# Patient Record
Sex: MALE | NOT HISPANIC OR LATINO | URBAN - METROPOLITAN AREA
[De-identification: names, ages, dates, MRNs, and addresses within clinical notes are randomized per-mention and may not be internally consistent; named-entity substitution may affect disease eponyms.]

---

## 2023-04-26 ENCOUNTER — OFFICE VISIT (OUTPATIENT)
Dept: FAMILY MEDICINE | Facility: CLINIC | Age: 53
End: 2023-04-26
Payer: COMMERCIAL

## 2023-04-26 VITALS
BODY MASS INDEX: 27.9 KG/M2 | RESPIRATION RATE: 16 BRPM | TEMPERATURE: 97.6 F | DIASTOLIC BLOOD PRESSURE: 84 MMHG | HEART RATE: 91 BPM | OXYGEN SATURATION: 95 % | HEIGHT: 72 IN | SYSTOLIC BLOOD PRESSURE: 128 MMHG | WEIGHT: 206 LBS

## 2023-04-26 DIAGNOSIS — Z72.0 TOBACCO ABUSE: ICD-10-CM

## 2023-04-26 DIAGNOSIS — M25.50 MULTIPLE JOINT PAIN: ICD-10-CM

## 2023-04-26 DIAGNOSIS — Z13.220 LIPID SCREENING: ICD-10-CM

## 2023-04-26 DIAGNOSIS — R10.31 RIGHT GROIN PAIN: ICD-10-CM

## 2023-04-26 DIAGNOSIS — D22.9 ATYPICAL MOLE: ICD-10-CM

## 2023-04-26 DIAGNOSIS — Z13.1 SCREENING FOR DIABETES MELLITUS: ICD-10-CM

## 2023-04-26 DIAGNOSIS — E78.5 HYPERLIPIDEMIA LDL GOAL <100: ICD-10-CM

## 2023-04-26 DIAGNOSIS — Z12.5 SCREENING PSA (PROSTATE SPECIFIC ANTIGEN): ICD-10-CM

## 2023-04-26 DIAGNOSIS — Z00.00 ROUTINE GENERAL MEDICAL EXAMINATION AT A HEALTH CARE FACILITY: Primary | ICD-10-CM

## 2023-04-26 DIAGNOSIS — Z12.11 COLON CANCER SCREENING: ICD-10-CM

## 2023-04-26 LAB
ALBUMIN SERPL-MCNC: 3.8 G/DL (ref 3.4–5)
ALP SERPL-CCNC: 58 U/L (ref 40–150)
ALT SERPL W P-5'-P-CCNC: 37 U/L (ref 0–70)
ANION GAP SERPL CALCULATED.3IONS-SCNC: 6 MMOL/L (ref 3–14)
AST SERPL W P-5'-P-CCNC: 25 U/L (ref 0–45)
BASOPHILS # BLD AUTO: 0.1 10E3/UL (ref 0–0.2)
BASOPHILS NFR BLD AUTO: 1 %
BILIRUB SERPL-MCNC: 0.3 MG/DL (ref 0.2–1.3)
BUN SERPL-MCNC: 20 MG/DL (ref 7–30)
CALCIUM SERPL-MCNC: 8.7 MG/DL (ref 8.5–10.1)
CHLORIDE BLD-SCNC: 109 MMOL/L (ref 94–109)
CHOLEST SERPL-MCNC: 178 MG/DL
CO2 SERPL-SCNC: 26 MMOL/L (ref 20–32)
CREAT SERPL-MCNC: 1.06 MG/DL (ref 0.66–1.25)
EOSINOPHIL # BLD AUTO: 0.2 10E3/UL (ref 0–0.7)
EOSINOPHIL NFR BLD AUTO: 1 %
ERYTHROCYTE [DISTWIDTH] IN BLOOD BY AUTOMATED COUNT: 13.5 % (ref 10–15)
FASTING STATUS PATIENT QL REPORTED: NO
GFR SERPL CREATININE-BSD FRML MDRD: 84 ML/MIN/1.73M2
GLUCOSE BLD-MCNC: 96 MG/DL (ref 70–99)
HBA1C MFR BLD: 5.7 % (ref 0–5.6)
HCT VFR BLD AUTO: 45.6 % (ref 40–53)
HDLC SERPL-MCNC: 34 MG/DL
HGB BLD-MCNC: 15.2 G/DL (ref 13.3–17.7)
LDLC SERPL CALC-MCNC: 111 MG/DL
LYMPHOCYTES # BLD AUTO: 3.2 10E3/UL (ref 0.8–5.3)
LYMPHOCYTES NFR BLD AUTO: 22 %
MCH RBC QN AUTO: 30.9 PG (ref 26.5–33)
MCHC RBC AUTO-ENTMCNC: 33.3 G/DL (ref 31.5–36.5)
MCV RBC AUTO: 93 FL (ref 78–100)
MONOCYTES # BLD AUTO: 0.8 10E3/UL (ref 0–1.3)
MONOCYTES NFR BLD AUTO: 6 %
NEUTROPHILS # BLD AUTO: 10.3 10E3/UL (ref 1.6–8.3)
NEUTROPHILS NFR BLD AUTO: 70 %
NONHDLC SERPL-MCNC: 144 MG/DL
PLATELET # BLD AUTO: 207 10E3/UL (ref 150–450)
POTASSIUM BLD-SCNC: 3.9 MMOL/L (ref 3.4–5.3)
PROT SERPL-MCNC: 7.7 G/DL (ref 6.8–8.8)
PSA SERPL-MCNC: 0.76 UG/L (ref 0–4)
RBC # BLD AUTO: 4.92 10E6/UL (ref 4.4–5.9)
SODIUM SERPL-SCNC: 141 MMOL/L (ref 133–144)
TRIGL SERPL-MCNC: 163 MG/DL
TSH SERPL DL<=0.005 MIU/L-ACNC: 1.45 MU/L (ref 0.4–4)
WBC # BLD AUTO: 14.6 10E3/UL (ref 4–11)

## 2023-04-26 PROCEDURE — G0103 PSA SCREENING: HCPCS | Performed by: PHYSICIAN ASSISTANT

## 2023-04-26 PROCEDURE — 86039 ANTINUCLEAR ANTIBODIES (ANA): CPT | Performed by: PHYSICIAN ASSISTANT

## 2023-04-26 PROCEDURE — 36415 COLL VENOUS BLD VENIPUNCTURE: CPT | Performed by: PHYSICIAN ASSISTANT

## 2023-04-26 PROCEDURE — 99386 PREV VISIT NEW AGE 40-64: CPT | Performed by: PHYSICIAN ASSISTANT

## 2023-04-26 PROCEDURE — 80050 GENERAL HEALTH PANEL: CPT | Performed by: PHYSICIAN ASSISTANT

## 2023-04-26 PROCEDURE — 99204 OFFICE O/P NEW MOD 45 MIN: CPT | Mod: 25 | Performed by: PHYSICIAN ASSISTANT

## 2023-04-26 PROCEDURE — 86431 RHEUMATOID FACTOR QUANT: CPT | Performed by: PHYSICIAN ASSISTANT

## 2023-04-26 PROCEDURE — 83036 HEMOGLOBIN GLYCOSYLATED A1C: CPT | Performed by: PHYSICIAN ASSISTANT

## 2023-04-26 PROCEDURE — 80061 LIPID PANEL: CPT | Performed by: PHYSICIAN ASSISTANT

## 2023-04-26 PROCEDURE — 86038 ANTINUCLEAR ANTIBODIES: CPT | Performed by: PHYSICIAN ASSISTANT

## 2023-04-26 ASSESSMENT — ENCOUNTER SYMPTOMS
NERVOUS/ANXIOUS: 1
JOINT SWELLING: 0
PARESTHESIAS: 0
HEADACHES: 1
HEMATOCHEZIA: 0
FEVER: 0
ARTHRALGIAS: 1
MYALGIAS: 0
DYSURIA: 0
SHORTNESS OF BREATH: 0
CONSTIPATION: 0
ABDOMINAL PAIN: 0
SORE THROAT: 1
FREQUENCY: 0
DIARRHEA: 0
WEAKNESS: 0
NAUSEA: 0
CHILLS: 0
HEMATURIA: 0
PALPITATIONS: 0
EYE PAIN: 0
HEARTBURN: 0

## 2023-04-26 ASSESSMENT — PAIN SCALES - GENERAL: PAINLEVEL: MODERATE PAIN (5)

## 2023-04-26 NOTE — PROGRESS NOTES
SUBJECTIVE:   CC: Roverto is an 52 year old who presents for preventative health visit.        View : No data to display.            Patient has been advised of split billing requirements and indicates understanding: Yes  Healthy Habits:     Getting at least 3 servings of Calcium per day:  Yes    Bi-annual eye exam:  NO    Dental care twice a year:  NO    Sleep apnea or symptoms of sleep apnea:  Sleep apnea    Diet:  Other    Frequency of exercise:  2-3 days/week    Duration of exercise:  30-45 minutes    Taking medications regularly:  Yes    Medication side effects:  Not applicable    PHQ-2 Total Score: 2    Additional concerns today:  No    Patient works as a district  as well as a .  Typically works 16 hours/day.    Patient did participate in [employer] trial.  States that he was noted to have elevated blood pressures during that.    Patient has been having multiple joint pains over the last 3 to 4 years.  This has been worsening.  He is worried about rheumatoid arthritis as there is family history.  For his discomfort he has been taking Aleve roughly 4 tablets 4-5 times per day.  Does not take any Tylenol for this.  Does report family history of renal issues.    Patient also has a lesion over the right side of his face.  Noticed this mole has been increasing in size as well as bleeding over the last 1 to 2 years.  It has been dry and flaking.  Occasionally painful.    Has prior contracture of left fourth digit.  This is unchanged from baseline as he had surgery when in his 20s.    Patient with right-sided groin pain worse with coughing.  No testicular pain.       Today's PHQ-2 Score:       4/26/2023     6:58 AM   PHQ-2 ( 1999 Pfizer)   Q1: Little interest or pleasure in doing things 1   Q2: Feeling down, depressed or hopeless 1   PHQ-2 Score 2   Q1: Little interest or pleasure in doing things Not at all    Several days   Q2: Feeling down, depressed or hopeless More than half the  days    Several days   PHQ-2 Score 2    2     Social History     Tobacco Use     Smoking status: Every Day     Types: Cigarettes     Smokeless tobacco: Never   Vaping Use     Vaping status: Never Used   Substance Use Topics     Alcohol use: Not on file   Patient smokes 1 to 1.5 packs/day.  Does not want any smoking cessation information at this time.        4/26/2023     6:58 AM   Alcohol Use   Prescreen: >3 drinks/day or >7 drinks/week? No   Occasional alcohol use with bowling.    Last PSA: No results found for: PSA    Reviewed orders with patient. Reviewed health maintenance      Reviewed and updated as needed this visit by clinical staff   Tobacco  Allergies  Meds              Reviewed and updated as needed this visit by Provider                 No past medical history on file.   No past surgical history on file.    Review of Systems   Constitutional: Negative for chills and fever.   HENT: Positive for sore throat. Negative for congestion, ear pain and hearing loss.    Eyes: Positive for visual disturbance. Negative for pain.   Respiratory: Negative for shortness of breath.    Cardiovascular: Positive for peripheral edema. Negative for chest pain and palpitations.   Gastrointestinal: Negative for abdominal pain, constipation, diarrhea, heartburn, hematochezia and nausea.   Genitourinary: Negative for dysuria, frequency, genital sores, hematuria, impotence, penile discharge and urgency.   Musculoskeletal: Positive for arthralgias. Negative for joint swelling and myalgias.   Skin: Negative for rash.   Neurological: Positive for headaches. Negative for weakness and paresthesias.   Psychiatric/Behavioral: Negative for mood changes. The patient is nervous/anxious.        OBJECTIVE:   /84   Pulse 91   Temp 97.6  F (36.4  C) (Tympanic)   Resp 16   Ht 1.829 m (6')   Wt 93.4 kg (206 lb)   SpO2 95%   BMI 27.94 kg/m      Physical Exam  GENERAL: healthy, alert and no distress  EYES: Eyes grossly normal to  inspection, PERRL and conjunctivae and sclerae normal  HENT: ear canals and TM's normal, nose and mouth without ulcers or lesions  NECK: no adenopathy, no asymmetry, masses, or scars and thyroid normal to palpation  RESP: lungs clear to auscultation - no rales, rhonchi or wheezes  CV: regular rate and rhythm, normal S1 S2, no S3 or S4, no murmur, click or rub, no peripheral edema and peripheral pulses strong  ABDOMEN: soft, nontender, no hepatosplenomegaly, no masses and bowel sounds normal  MS: Contracture of the left fourth digit with no other obvious bony deformities on examination.  SKIN: Right cheek with 1 cm x 1.4 cm mole with irregular borders and raised tissue.  No obvious bleeding.  No surrounding erythema  NEURO: Normal strength and tone, mentation intact and speech normal  PSYCH: mentation appears normal, affect normal/bright    ASSESSMENT/PLAN:   (Z00.00) Routine general medical examination at a health care facility  (primary encounter diagnosis)  Comment: Here for routine screening examination  Plan: Comprehensive metabolic panel (BMP + Alb, Alk         Phos, ALT, AST, Total. Bili, TP), CBC with         platelets and differential, TSH with free T4         reflex          (Z13.1) Screening for diabetes mellitus  Comment: Discussed screening labs  Plan: Comprehensive metabolic panel (BMP + Alb, Alk         Phos, ALT, AST, Total. Bili, TP), Hemoglobin         A1c          (Z13.220) Lipid screening  Comment: Discussed screening labs  Plan: Lipid panel reflex to direct LDL Fasting          (Z12.5) Screening PSA (prostate specific antigen)  Comment: Patient after the age of 50 no prior PSA screen.  Discussed with patient indications for this.  Plan: PSA, screen          (D22.9) Atypical mole  Comment: Patient has atypical mole over the right side of his face that has been increasing in size as well as bleeding over the last year.  Discussed with the patient significant concern that this may represent a more  concerning process and needs to follow-up with dermatology more urgently.  Patient expressed understanding.  Plan: Adult Dermatology Referral          (M25.50) Multiple joint pain  Comment: Multiple joint pain.  Patient has been taking large amounts of Aleve.  I discussed with him at length to decrease the Aleve dose he has been consuming out of concerns of kidney impairment.  Patient stressed understanding.  He will try to cut back on this as well as use Tylenol as needed for pain control.  Discussed rheumatoid factor as well as MANDI given multiple joint pain and family history of rheumatoid.  Plan: Comprehensive metabolic panel (BMP + Alb, Alk         Phos, ALT, AST, Total. Bili, TP), Rheumatoid         factor, Anti Nuclear Elisabeth IgG by IFA with Reflex          (R10.31) Right groin pain  Comment: Right groin pain with coughing.  No significant palpable protrusion on examination.  Patient does have tenderness over distribution of inguinal hernia.  Discussed ultrasound of this.  Patient will call to schedule.  Plan: US Hernia Evaluation          (Z12.11) Colon cancer screening  Comment: Colonoscopy referral placed.  Plan: Colonoscopy Screening  Referral      COUNSELING:   Reviewed preventive health counseling, as reflected in patient instructions       Regular exercise       Healthy diet/nutrition       Vision screening       Alcohol Use        Colorectal cancer screening       Prostate cancer screening      BMI:   Estimated body mass index is 27.94 kg/m  as calculated from the following:    Height as of this encounter: 1.829 m (6').    Weight as of this encounter: 93.4 kg (206 lb).   Weight management plan: Discussed healthy diet and exercise guidelines      He reports that he has been smoking cigarettes. He has never used smokeless tobacco.  Nicotine/Tobacco Cessation Plan:   Information offered: Patient not interested at this time            JEAN Mcgowan [facility]

## 2023-04-26 NOTE — LETTER
April 27, 2023      Roverto Dave  [address] [place] [address]        Dear ,    We are writing to inform you of your test results.    Mr. Dave,     Please schedule your appointment with dermatology. The dermatology clinic had tried to reach you. I strongly advise you follow up for further evaluation of your skin lesion.   [facility] will call you to coordinate your care as prescribed by your provider. If you don't hear from a representative within 2 business days, please call [phone].     Your prostate antigen screening is normal.     Your kidney function and liver function are normal.     Normal thyroid function study.     Your white blood cell count is elevated. You were not having any infectious symptoms but we will continue to monitor this.     Your hemoglobin A1c is elevated to the prediabetic range. As discussed healthy lifestyle modifications including exercising 150 minutes per week. We will continue to monitor this.       Your triglycerides and LDL cholesterol are elevated. -LDL(bad) cholesterol level is elevated, and your triglycerides are elevated which can increase your heart disease risk.  A diet high in fat and simple carbohydrates, genetics and being overweight can contribute to this. ADVISE: exercising 150 minutes of aerobic exercise per week (30 minutes for 5 days per week or 50 minutes for 3 days per week are options), eating a low saturated fat/low carbohydrate diet, and omega-3 fatty acids (fish oil) 0311-0617 mg daily are helpful to improve this. In 12 months, you should recheck your fasting cholesterol panel by scheduling a lab-only appointment.       I would recommend that we start you on a statin medication for this as you are aty increased risk of cardiovascular disease. The largest side effect of this medication is muscle aches. Take in the evening to decrease side effects. Follow up in one month to assess how you are tolerating the medications.           The  10-year ASCVD risk score (Colleen MANN, et al., 2019) is: 11.4%     Values used to calculate the score:       Age: 52 years       Sex: Male       Is Non- : No       Diabetic: No       Tobacco smoker: Yes       Systolic Blood Pressure: 128 mmHg       Is BP treated: No       HDL Cholesterol: 34 mg/dL       Total Cholesterol: 178 mg/dL       If you have any further questions please reach out to the clinic.       Resulted Orders   Comprehensive metabolic panel (BMP + Alb, Alk Phos, ALT, AST, Total. Bili, TP)   Result Value Ref Range    Sodium 141 133 - 144 mmol/L    Potassium 3.9 3.4 - 5.3 mmol/L    Chloride 109 94 - 109 mmol/L    Carbon Dioxide (CO2) 26 20 - 32 mmol/L    Anion Gap 6 3 - 14 mmol/L    Urea Nitrogen 20 7 - 30 mg/dL    Creatinine 1.06 0.66 - 1.25 mg/dL    Calcium 8.7 8.5 - 10.1 mg/dL    Glucose 96 70 - 99 mg/dL    Alkaline Phosphatase 58 40 - 150 U/L    AST 25 0 - 45 U/L    ALT 37 0 - 70 U/L    Protein Total 7.7 6.8 - 8.8 g/dL    Albumin 3.8 3.4 - 5.0 g/dL    Bilirubin Total 0.3 0.2 - 1.3 mg/dL    GFR Estimate 84 >60 mL/min/1.73m2      Comment:      eGFR calculated using 2021 CKD-EPI equation.   Lipid panel reflex to direct LDL Fasting   Result Value Ref Range    Cholesterol 178 <200 mg/dL    Triglycerides 163 (H) <150 mg/dL    Direct Measure HDL 34 (L) >=40 mg/dL    LDL Cholesterol Calculated 111 (H) <=100 mg/dL    Non HDL Cholesterol 144 (H) <130 mg/dL    Patient Fasting > 8hrs? No     Narrative    Cholesterol  Desirable:  <200 mg/dL    Triglycerides  Normal:  Less than 150 mg/dL  Borderline High:  150-199 mg/dL  High:  200-499 mg/dL  Very High:  Greater than or equal to 500 mg/dL    Direct Measure HDL  Female:  Greater than or equal to 50 mg/dL   Male:  Greater than or equal to 40 mg/dL    LDL Cholesterol  Desirable:  <100mg/dL  Above Desirable:  100-129 mg/dL   Borderline High:  130-159 mg/dL   High:  160-189 mg/dL   Very High:  >= 190 mg/dL    Non HDL Cholesterol  Desirable:  130  mg/dL  Above Desirable:  130-159 mg/dL  Borderline High:  160-189 mg/dL  High:  190-219 mg/dL  Very High:  Greater than or equal to 220 mg/dL   PSA, screen   Result Value Ref Range    Prostate Specific Antigen Screen 0.76 0.00 - 4.00 ug/L   Hemoglobin A1c   Result Value Ref Range    Hemoglobin A1C 5.7 (H) 0.0 - 5.6 %      Comment:      Normal <5.7%   Prediabetes 5.7-6.4%    Diabetes 6.5% or higher     Note: Adopted from ADA consensus guidelines.   TSH with free T4 reflex   Result Value Ref Range    TSH 1.45 0.40 - 4.00 mU/L   CBC with platelets and differential   Result Value Ref Range    WBC Count 14.6 (H) 4.0 - 11.0 10e3/uL    RBC Count 4.92 4.40 - 5.90 10e6/uL    Hemoglobin 15.2 13.3 - 17.7 g/dL    Hematocrit 45.6 40.0 - 53.0 %    MCV 93 78 - 100 fL    MCH 30.9 26.5 - 33.0 pg    MCHC 33.3 31.5 - 36.5 g/dL    RDW 13.5 10.0 - 15.0 %    Platelet Count 207 150 - 450 10e3/uL    % Neutrophils 70 %    % Lymphocytes 22 %    % Monocytes 6 %    % Eosinophils 1 %    % Basophils 1 %    Absolute Neutrophils 10.3 (H) 1.6 - 8.3 10e3/uL    Absolute Lymphocytes 3.2 0.8 - 5.3 10e3/uL    Absolute Monocytes 0.8 0.0 - 1.3 10e3/uL    Absolute Eosinophils 0.2 0.0 - 0.7 10e3/uL    Absolute Basophils 0.1 0.0 - 0.2 10e3/uL       If you have any questions or concerns, please call the clinic at the number listed above.       Sincerely,      Onesimo Jaeger PA-C

## 2023-04-26 NOTE — PATIENT INSTRUCTIONS
Call [phone] to schedule your imaging study.         Preventive Health Recommendations  Male Ages 50 - 64    Yearly exam:             See your health care provider every year in order to  o   Review health changes.   o   Discuss preventive care.    o   Review your medicines if your doctor has prescribed any.   Have a cholesterol test every 5 years, or more frequently if you are at risk for high cholesterol/heart disease.   Have a diabetes test (fasting glucose) every three years. If you are at risk for diabetes, you should have this test more often.   Have a colonoscopy at age 50, or have a yearly FIT test (stool test). These exams will check for colon cancer.    Talk with your health care provider about whether or not a prostate cancer screening test (PSA) is right for you.  You should be tested each year for STDs (sexually transmitted diseases), if you re at risk.     Shots: Get a flu shot each year. Get a tetanus shot every 10 years.     Nutrition:  Eat at least 5 servings of fruits and vegetables daily.   Eat whole-grain bread, whole-wheat pasta and brown rice instead of white grains and rice.   Get adequate Calcium and Vitamin D.     Lifestyle  Exercise for at least 150 minutes a week (30 minutes a day, 5 days a week). This will help you control your weight and prevent disease.   Limit alcohol to one drink per day.   No smoking.   Wear sunscreen to prevent skin cancer.   See your dentist every six months for an exam and cleaning.   See your eye doctor every 1 to 2 years.

## 2023-04-26 NOTE — PROGRESS NOTES
"SUBJECTIVE:   CC: Roverto is an 52 year old who presents for preventative health visit.        View : No data to display.            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 :641767}    {additional problems to add (Optional):607221}    Today's PHQ-2 Score:       4/26/2023     6:58 AM   PHQ-2 ( 1999 Pfizer)   Q1: Little interest or pleasure in doing things 1   Q2: Feeling down, depressed or hopeless 1   PHQ-2 Score 2   Q1: Little interest or pleasure in doing things Not at all    Several days   Q2: Feeling down, depressed or hopeless More than half the days    Several days   PHQ-2 Score 2    2     Social History     Tobacco Use     Smoking status: Every Day     Types: Cigarettes     Smokeless tobacco: Never   Vaping Use     Vaping status: Never Used   Substance Use Topics     Alcohol use: Not on file     1-1.5 PPD does not want resources right not         4/26/2023     6:58 AM   Alcohol Use   Prescreen: >3 drinks/day or >7 drinks/week? No   occasional alcohol. Usually one drink with bow  Last PSA: No results found for: PSA    Reviewed orders with patient. Reviewed health maintenance and updated orders accordingly - { :409498::\"Yes\"}  {Chronicprobdata (optional):012611}    Reviewed and updated as needed this visit " "by clinical staff   Tobacco  Allergies  Meds              Reviewed and updated as needed this visit by Provider                 {HISTORY OPTIONS (Optional):166818}    Review of Systems   Constitutional: Negative for chills and fever.   HENT: Positive for sore throat. Negative for congestion, ear pain and hearing loss.    Eyes: Positive for visual disturbance. Negative for pain.   Respiratory: Negative for shortness of breath.    Cardiovascular: Positive for peripheral edema. Negative for chest pain and palpitations.   Gastrointestinal: Negative for abdominal pain, constipation, diarrhea, heartburn, hematochezia and nausea.   Genitourinary: Negative for dysuria, frequency, genital sores, hematuria, impotence, penile discharge and urgency.   Musculoskeletal: Positive for arthralgias. Negative for joint swelling and myalgias.   Skin: Negative for rash.   Neurological: Positive for headaches. Negative for weakness and paresthesias.   Psychiatric/Behavioral: Negative for mood changes. The patient is nervous/anxious.        OBJECTIVE:   /84   Pulse 91   Temp 97.6  F (36.4  C) (Tympanic)   Resp 16   Ht 1.829 m (6')   Wt 93.4 kg (206 lb)   SpO2 95%   BMI 27.94 kg/m      Physical Exam   1 cm x 1.4 cm right face just below the eye   {Exam Choices (Optional):041029}    {Diagnostic Test Results (Optional):221082::\"Diagnostic Test Results:\",\"Labs reviewed in Epic\"}    ASSESSMENT/PLAN:   {Diag Picklist:889309}    {Patient advised of split billing (Optional):783119}      COUNSELING:   {MALE COUNSELING MESSAGES:968775::\"Reviewed preventive health counseling, as reflected in patient instructions\"}      BMI:   Estimated body mass index is 27.94 kg/m  as calculated from the following:    Height as of this encounter: 1.829 m (6').    Weight as of this encounter: 93.4 kg (206 lb).   {Weight Management Plan needed for ACO:167494}      He reports that he has been smoking cigarettes. He has never used smokeless " tobacco.  Nicotine/Tobacco Cessation Plan:   {Nicotine/Tobacco Cessation Plan:393452}      {Counseling Resources  [place] Preventive Services Task Force  Cholesterol Screening  Health diet/nutrition  Pooled Cohorts Equation Calculator  USDA's MyPlate  ASA Prophylaxis  Lung CA Screening  Osteoporosis prevention/bone health :669467}  {Prostate Cancer Screening  Consider for men 55-69 per guidance from USPSTF :830978}    JEAN Mcgowan [facility]

## 2023-04-27 LAB
ANA PAT SER IF-IMP: ABNORMAL
ANA SER QL IF: POSITIVE
ANA TITR SER IF: ABNORMAL {TITER}
RHEUMATOID FACT SER NEPH-ACNC: <7 IU/ML

## 2023-04-27 RX ORDER — ATORVASTATIN CALCIUM 20 MG/1
20 TABLET, FILM COATED ORAL DAILY
Qty: 30 TABLET | Refills: 0 | Status: SHIPPED | OUTPATIENT
Start: 2023-04-27 | End: 2023-05-27

## 2023-04-28 ENCOUNTER — TELEPHONE (OUTPATIENT)
Dept: FAMILY MEDICINE | Facility: CLINIC | Age: 53
End: 2023-04-28
Payer: COMMERCIAL

## 2023-04-28 NOTE — TELEPHONE ENCOUNTER
----- Message from Onesimo Jaeger PA-C sent at 4/28/2023  7:23 AM CDT -----  Please call patient,     Mr. Dave,     Your MANDI test was positive.  Rheumatoid factor was not elevated.  I have placed referral for rheumatology.    Your cholesterol numbers were elevated.  I would suggest we start you on a statin medication.  You can have muscle aches with this medication.  Please start taking medication in the evening.  Follow-up within the next months to recheck to see how you are tolerating this medication.  I have sent atorvastatin to your pharmacy    Hemoglobin A1c is slightly elevated in the prediabetic category.    Please schedule with dermatology for evaluation of your skin lesion.    Onesimo Jaeger PA-C

## 2023-04-28 NOTE — TELEPHONE ENCOUNTER
Left a message to return a call to [phone].  Cristina Hardy R.N.      04/27/23 Sent (none) Onesimo Jaeger, [facility]     atorvastatin (LIPITOR) 20 MG tablet 30 tablet 0 4/27/2023 5/27/2023     [id number] - [place] - [address]

## 2023-04-28 NOTE — TELEPHONE ENCOUNTER
Patient returned call to clinic. Relayed provider results message as noted below. Reviewed care plan and recommendations from provider. Patient voiced good understanding and agreed to all plans.   No questions at this time from patient.      Leonie Ge RN  Clinical Triage/Primary Care  [facility]

## 2023-05-08 ENCOUNTER — TELEPHONE (OUTPATIENT)
Dept: DERMATOLOGY | Facility: CLINIC | Age: 53
End: 2023-05-08
Payer: COMMERCIAL

## 2023-05-08 NOTE — TELEPHONE ENCOUNTER
[facility] Call Center    Phone Message    May a detailed message be left on voicemail: yes     Reason for Call: Other: Pt has ref for Atypical mole [D22.9]  ref by Onesimo Jaeger, / Urgent: 3-5 Days / Please call [phone]. Thanks!      Action Taken: Message routed to:  [facility] DERM    Travel Screening: Not Applicable

## 2023-06-08 ENCOUNTER — OFFICE VISIT (OUTPATIENT)
Dept: DERMATOLOGY | Facility: CLINIC | Age: 53
End: 2023-06-08
Payer: COMMERCIAL

## 2023-06-08 DIAGNOSIS — L82.0 INFLAMED SEBORRHEIC KERATOSIS: Primary | ICD-10-CM

## 2023-06-08 PROCEDURE — 17110 DESTRUCTION B9 LES UP TO 14: CPT | Performed by: NURSE PRACTITIONER

## 2023-06-08 NOTE — PROGRESS NOTES
[facility] Dermatology Note  Encounter Date: Jun 8, 2023  Office Visit     Reviewed patients past medical history and pertinent chart review prior to patients visit today.     Dermatology Problem List:  ISK face and right upper arm, cryo 6/8/2023     Patient denies family history of skin cancer or dysplastic nevi.   Patient denies personal history of skin cancer or dysplastic nevi.   ____________________________________________    Assessment & Plan:     # Irritated and inflamed Seborrheic Keratosis.   -Discussed treatment options with patient including no treatment, cryotherapy, and shave removal. Patient prefers cryotherapy today due to irritation and itching. After verbal consent and discussion of risks and benefits including but no limited to dyspigmentation/scar, blister, and pain, 3 on the left cheek, 1 on the left nose, 1 on the forehead, 1 on the right upper arm, and 2 on the right cheek with 8 in total were treated with 1-2mm freeze border for 2 cycles with liquid nitrogen. Post cryotherapy instructions were provided.     Follow-up in 6 weeks if desires further treatment    Written by Kelly Batista working as a scribe for ALESHA Link CNP on 06/08/23    Alcira Pugh CNP  Dermatology   _______________________________________    CC: Skin Check (Spot check x4: 3 on face and one on right arm. Changing. Has been going on for a few years )    HPI:  Mr. Roverto Dave is a(n) 52 year old male who presents today as a new patient for spot check with three on the face and one on the right arm.  The one on his face has tripled in size over the last 5 years and will only hurt in the winter time.  In the winter, he will notice some scaly, flakiness and itchiness to the papule.  In the last year smaller lesions scattered throughout his face have started to appear with some being sore.  He noticed another one on the right upper arm which has grown in size.  He is interested in having these  lesions frozen today.    Patient is otherwise feeling well, without additional skin concerns.    Physical Exam:  SKIN: Focused examination of face and upper extremitie was performed.  - Right medial cheek an elongated at least 1 cm brown cerebriform waxy plaque  - Several small tan waxy papules to the face with one on the right upper arm  - No other lesions of concern on areas examined.     Medications:  Current Outpatient Medications   Medication     atorvastatin (LIPITOR) 20 MG tablet     No current facility-administered medications for this visit.      Past Medical History:   There is no problem list on file for this patient.    History reviewed. No pertinent past medical history.    CC No referring provider defined for this encounter. on close of this encounter.

## 2023-06-08 NOTE — LETTER
6/8/2023         RE: Roverto Dave  [address]  [place] [address]        Dear Colleague,    Thank you for referring your patient, Roverto Dave, to the [facility]. Please see a copy of my visit note below.    [facility] Dermatology Note  Encounter Date: Jun 8, 2023  Office Visit     Reviewed patients past medical history and pertinent chart review prior to patients visit today.     Dermatology Problem List:  ISK face and right upper arm, cryo 6/8/2023     Patient denies family history of skin cancer or dysplastic nevi.   Patient denies personal history of skin cancer or dysplastic nevi.   ____________________________________________    Assessment & Plan:     # Irritated and inflamed Seborrheic Keratosis.   -Discussed treatment options with patient including no treatment, cryotherapy, and shave removal. Patient prefers cryotherapy today due to irritation and itching. After verbal consent and discussion of risks and benefits including but no limited to dyspigmentation/scar, blister, and pain, 3 on the left cheek, 1 on the left nose, 1 on the forehead, 1 on the right upper arm, and 2 on the right cheek with 8 in total were treated with 1-2mm freeze border for 2 cycles with liquid nitrogen. Post cryotherapy instructions were provided.     Follow-up in 6 weeks if desires further treatment    Written by Kelly Batista working as a scribe for ALESHA Link CNP on 06/08/23    Alcira Pugh CNP  Dermatology   _______________________________________    CC: Skin Check (Spot check x4: 3 on face and one on right arm. Changing. Has been going on for a few years )    HPI:  Mr. Roverto Dave is a(n) 52 year old male who presents today as a new patient for spot check with three on the face and one on the right arm.  The one on his face has tripled in size over the last 5 years and will only hurt in the winter time.  In the winter, he will notice some scaly, flakiness and itchiness  to the papule.  In the last year smaller lesions scattered throughout his face have started to appear with some being sore.  He noticed another one on the right upper arm which has grown in size.  He is interested in having these lesions frozen today.    Patient is otherwise feeling well, without additional skin concerns.    Physical Exam:  SKIN: Focused examination of face and upper extremitie was performed.  - Right medial cheek an elongated at least 1 cm brown cerebriform waxy plaque  - Several small tan waxy papules to the face with one on the right upper arm  - No other lesions of concern on areas examined.     Medications:  Current Outpatient Medications   Medication     atorvastatin (LIPITOR) 20 MG tablet     No current facility-administered medications for this visit.      Past Medical History:   There is no problem list on file for this patient.    History reviewed. No pertinent past medical history.    CC No referring provider defined for this encounter. on close of this encounter.       Again, thank you for allowing me to participate in the care of your patient.        Sincerely,        ALESHA Llamas CNP

## 2023-06-29 ENCOUNTER — TELEPHONE (OUTPATIENT)
Dept: GASTROENTEROLOGY | Facility: CLINIC | Age: 53
End: 2023-06-29
Payer: COMMERCIAL

## 2023-06-29 ENCOUNTER — HOSPITAL ENCOUNTER (OUTPATIENT)
Facility: AMBULATORY SURGERY CENTER | Age: 53
End: 2023-06-29
Attending: STUDENT IN AN ORGANIZED HEALTH CARE EDUCATION/TRAINING PROGRAM
Payer: COMMERCIAL

## 2023-06-29 NOTE — TELEPHONE ENCOUNTER
"Endoscopy Scheduling Screen    Have you had a positive Covid test in the last 14 days?  No    Are you active on MyChart?   No    What insurance is in the chart?  Other:  [facility]    Ordering/Referring Provider: AMARA ORNELAS   (If ordering provider performs procedure, schedule with ordering provider unless otherwise instructed. )    BMI: Estimated body mass index is 27.94 kg/m  as calculated from the following:    Height as of 4/26/23: 1.829 m (6').    Weight as of 4/26/23: 93.4 kg (206 lb).     Sedation Ordered  moderate sedation.   If patient BMI > 50 do not schedule in ASC.    Are you taking any prescription medications for pain?   No    Are you taking methadone or Suboxone?  No    Do you have a history of malignant hyperthermia or adverse reaction to anesthesia?  No    (Females) Are you currently pregnant?        Have you been diagnosed or told you have pulmonary hypertension?   No    Do you have an LVAD?  No    Have you been told you have moderate to severe sleep apnea?  No    Have you been told you have COPD, asthma, or any other lung disease?  Yes     What breathing problems do you have?  COPD     Do you use home oxygen?  No    Have your breathing problems required an ED visit or hospitalization in the last year?  No    Do you have any heart conditions?  Yes     In the past 6 months, have you had any hospitalizations for heart related issues including cardiomyopathy, heart attack, or stent placement?  No    Do you have any implantable devices in your body (pacemaker, ICD)?  No    Do you take nitroglycerine?  No    Have you ever had or are you awaiting a heart or lung transplant?   No    Have you had a stroke or transient ischemic attack (TIA aka \"mini stroke\" in the last 6 months?   No    Have you been diagnosed with or been told you have cirrhosis of the liver?   No    Are you currently on dialysis?   No    Do you need assistance transferring?   No    BMI: Estimated body mass index is 27.94 kg/m  as " calculated from the following:    Height as of 4/26/23: 1.829 m (6').    Weight as of 4/26/23: 93.4 kg (206 lb).     Is patients BMI > 40 and scheduling location [facility]?  No    Do you take the medication Phentermine, Ozempic or Wegovy?  No    Do you take the medication Naltrexone?  No    Do you take blood thinners?  No      Prep   Are you currently on dialysis or do you have chronic kidney disease?  No    Do you have a diagnosis of diabetes?  Yes (Golytely Prep)    Do you have a diagnosis of cystic fibrosis (CF)?  No    On a regular basis do you go 3 -5 days between bowel movements?  No    BMI > 40?  No    Preferred Pharmacy:    [id number] - [place], [place] - [address]  [address] [address]  Phone: [phone] Fax: [phone]      Final Scheduling Details   Colonoscopy prep sent?  Standard Golytely    Procedure scheduled  Colonoscopy    Surgeon:  JENNY     Date of procedure:  9/11     Schedule PAC:   No    Location  [facility] - [facility]    Sedation   Moderate Sedation    Patient Reminders:    You will receive a call from a Nurse to review instructions and health history.  This assessment must be completed prior to your procedure.  Failure to complete the Nurse assessment may result in the procedure being cancelled.       On the day of your procedure, please designate an adult(s) who can drive you home stay with you for the next 24 hours. The medicines used in the exam will make you sleepy. You will not be able to drive.       You cannot take public transportation, ride share services, or non-medical taxi service without a responsible caregiver.  Medical transport services are allowed with the requirement that a responsible caregiver will receive you at your destination.  We require that drivers and caregivers are confirmed prior to your procedure.

## 2023-07-20 ENCOUNTER — OFFICE VISIT (OUTPATIENT)
Dept: DERMATOLOGY | Facility: CLINIC | Age: 53
End: 2023-07-20
Payer: COMMERCIAL

## 2023-07-20 DIAGNOSIS — L82.0 INFLAMED SEBORRHEIC KERATOSIS: Primary | ICD-10-CM

## 2023-07-20 PROCEDURE — 17110 DESTRUCTION B9 LES UP TO 14: CPT | Performed by: NURSE PRACTITIONER

## 2023-07-20 NOTE — PATIENT INSTRUCTIONS
Patient Education       Proper skin care from [facility]:    -Eliminate harsh soaps as they strip the natural oils from the skin, often resulting in dry itchy skin ( i.e. Dial, Zest, Syriac Spring)  -Use mild soaps such as Cetaphil or Dove Sensitive Skin in the shower. You do not need to use soap on arms, legs, and trunk every time you shower unless visibly soiled.   -Avoid hot or cold showers.  -After showering, lightly dry off and apply moisturizing within 2-3 minutes. This will help trap moisture in the skin.   -Aggressive use of a moisturizer at least 1-2 times a day to the entire body (including -Vanicream, Cetaphil, Aquaphor or Cerave) and moisturize hands after every washing.  -We recommend using moisturizers that come in a tub that needs to be scooped out, not a pump. This has more of an oil base. It will hold moisture in your skin much better than a water base moisturizer. The above recommended are non-pore clogging.      Wear a sunscreen with at least SPF 30 on your face, ears, neck and V of the chest daily. Wear sunscreen on other areas of the body if those areas are exposed to the sun throughout the day. Sunscreens can contain physical and/or chemical blockers. Physical blockers are less likely to clog pores, these include zinc oxide and titanium dioxide. Reapply every two hour and after swimming.     Sunscreen examples: https://www.ewg.org/sunscreen/    UV radiation  UVA radiation remains constant throughout the day and throughout the year. It is a longer wavelength than UVB and therefore penetrates deeper into the skin leading to immediate and delayed tanning, photoaging, and skin cancer. 70-80% of UVA and UVB radiation occurs between the hours of 10am-2pm.  UVB radiation  UVB radiation causes the most harmful effects and is more significant during the summer months. However, snow and ice can reflect UVB radiation leading to skin damage during the winter months as well. UVB radiation is  responsible for tanning, burning, inflammation, delayed erythema (pinkness), pigmentation (brown spots), and skin cancer.     I recommend self monthly full body exams and yearly full body exams with a dermatology provider. If you develop a new or changing lesion please follow up for examination. Most skin cancers are pink and scaly or pink and pearly. However, we do see blue/brown/black skin cancers.  Consider the ABCDEs of melanoma when giving yourself your monthly full body exam ( don't forget the groin, buttocks, feet, toes, etc). A-asymmetry, B-borders, C-color, D-diameter, E-elevation or evolving. If you see any of these changes please follow up in clinic. If you cannot see your back I recommend purchasing a hand held mirror to use with a larger wall mirror.       Checking for Skin Cancer  You can find cancer early by checking your skin each month. There are 3 kinds of skin cancer. They are melanoma, basal cell carcinoma, and squamous cell carcinoma. Doing monthly skin checks is the best way to find new marks or skin changes. Follow the instructions below for checking your skin.   The ABCDEs of checking moles for melanoma   Check your moles or growths for signs of melanoma using ABCDE:   Asymmetry: the sides of the mole or growth don t match  Border: the edges are ragged, notched, or blurred  Color: the color within the mole or growth varies  Diameter: the mole or growth is larger than 6 mm (size of a pencil eraser)  Evolving: the size, shape, or color of the mole or growth is changing (evolving is not shown in the images below)    Checking for other types of skin cancer  Basal cell carcinoma or squamous cell carcinoma have symptoms such as:     A spot or mole that looks different from all other marks on your skin  Changes in how an area feels, such as itching, tenderness, or pain  Changes in the skin's surface, such as oozing, bleeding, or scaliness  A sore that does not heal  New swelling or redness beyond  the border of a mole    Who s at risk?  Anyone can get skin cancer. But you are at greater risk if you have:   Fair skin, light-colored hair, or light-colored eyes  Many moles or abnormal moles on your skin  A history of sunburns from sunlight or tanning beds  A family history of skin cancer  A history of exposure to radiation or chemicals  A weakened immune system  If you have had skin cancer in the past, you are at risk for recurring skin cancer.   How to check your skin  Do your monthly skin checkups in front of a full-length mirror. Check all parts of your body, including your:   Head (ears, face, neck, and scalp)  Torso (front, back, and sides)  Arms (tops, undersides, upper, and lower armpits)  Hands (palms, backs, and fingers, including under the nails)  Buttocks and genitals  Legs (front, back, and sides)  Feet (tops, soles, toes, including under the nails, and between toes)  If you have a lot of moles, take digital photos of them each month. Make sure to take photos both up close and from a distance. These can help you see if any moles change over time.   Most skin changes are not cancer. But if you see any changes in your skin, call your doctor right away. Only he or she can diagnose a problem. If you have skin cancer, seeing your doctor can be the first step toward getting the treatment that could save your life.   [employer] last reviewed this educational content on 4/1/2019 2000-2020 [employer]. [address] [place] [address], [place] [address]. All rights reserved. This information is not intended as a substitute for professional medical care. Always follow your healthcare professional's instructions.       When should I call my doctor?  If you are worsening or not improving, please, contact us or seek urgent care as noted below.     Who should I call with questions (adults)?  [facility] (adult and pediatric): [phone]  [facility]  [place] (adult): [phone]  [facility] ([place], [place], [place] and [place]) [phone]  For urgent needs outside of business hours call the [facility] at [phone] and ask for the dermatology resident on call to be paged  If this is a medical emergency and you are unable to reach an ER, Call 911      If you need a prescription refill, please contact your pharmacy. Refills are approved or denied by our Physicians during normal business hours, Monday through Fridays  Per office policy, refills will not be granted if you have not been seen within the past year (or sooner depending on your child's condition)

## 2023-07-20 NOTE — PROGRESS NOTES
[facility] Dermatology Note  Encounter Date: Jul 20, 2023  Office Visit     Reviewed patients past medical history and pertinent chart review prior to patients visit today.     Dermatology Problem List:  ISK face and right upper arm    Patient denies family history of skin cancer or dysplastic nevi.   Patient denies personal history of skin cancer or dysplastic nevi.   ____________________________________________    Assessment & Plan:     # Irritated and inflamed Seborrheic Keratosis.   -Discussed treatment options with patient including no treatment, cryotherapy, and shave removal. Patient prefers cryotherapy today due to irritation and itching. After verbal consent and discussion of risks and benefits including but no limited to dyspigmentation/scar, blister, and pain, 1 on the left cheek were treated with 1-2mm freeze border for 2 cycles with liquid nitrogen. Post cryotherapy instructions were provided.     Follow-up in 1 month if desires further treatment    Alcira Pugh CNP  Dermatology   _______________________________________    CC: Actinic Keratosis (Ak to right upper inner cheek grew in size following cryo [place] 6/8/23. All other areas clear)    HPI:  Mr. Roverto Dave is a(n) 53 year old male who presents today as a follow-up for seborrheic keratosis.  I last saw him on 6/8/2023 and treated several irritated seborrheic keratoses.  All of them went away he states except the one on his right cheek.  He thinks it is actually gotten larger since the treatment.    Patient is otherwise feeling well, without additional skin concerns.    Physical Exam:  SKIN: Focused examination of face was performed.  - Right medial cheek an elongated at least 1 cm brown cerebriform waxy plaque    Medications:  Current Outpatient Medications   Medication     atorvastatin (LIPITOR) 20 MG tablet     No current facility-administered medications for this visit.      Past Medical History:   There is no problem list on  file for this patient.    No past medical history on file.    CC No referring provider defined for this encounter. on close of this encounter.

## 2023-07-20 NOTE — LETTER
7/20/2023         RE: Roverto Dave  [address]  [place] [address]        Dear Colleague,    Thank you for referring your patient, Roverto Dave, to the [facility]. Please see a copy of my visit note below.    [facility] Dermatology Note  Encounter Date: Jul 20, 2023  Office Visit     Reviewed patients past medical history and pertinent chart review prior to patients visit today.     Dermatology Problem List:  ISK face and right upper arm    Patient denies family history of skin cancer or dysplastic nevi.   Patient denies personal history of skin cancer or dysplastic nevi.   ____________________________________________    Assessment & Plan:     # Irritated and inflamed Seborrheic Keratosis.   -Discussed treatment options with patient including no treatment, cryotherapy, and shave removal. Patient prefers cryotherapy today due to irritation and itching. After verbal consent and discussion of risks and benefits including but no limited to dyspigmentation/scar, blister, and pain, 1 on the left cheek were treated with 1-2mm freeze border for 2 cycles with liquid nitrogen. Post cryotherapy instructions were provided.     Follow-up in 1 month if desires further treatment    Alcira Pugh CNP  Dermatology   _______________________________________    CC: Actinic Keratosis (Ak to right upper inner cheek grew in size following cryo TX 6/8/23. All other areas clear)    HPI:  Mr. Roverto Dave is a(n) 53 year old male who presents today as a follow-up for seborrheic keratosis.  I last saw him on 6/8/2023 and treated several irritated seborrheic keratoses.  All of them went away he states except the one on his right cheek.  He thinks it is actually gotten larger since the treatment.    Patient is otherwise feeling well, without additional skin concerns.    Physical Exam:  SKIN: Focused examination of face was performed.  - Right medial cheek an elongated at least 1 cm brown cerebriform  waxy plaque    Medications:  Current Outpatient Medications   Medication     atorvastatin (LIPITOR) 20 MG tablet     No current facility-administered medications for this visit.      Past Medical History:   There is no problem list on file for this patient.    No past medical history on file.    CC No referring provider defined for this encounter. on close of this encounter.       Again, thank you for allowing me to participate in the care of your patient.        Sincerely,        ALESHA Llamas CNP

## 2023-08-10 ENCOUNTER — OFFICE VISIT (OUTPATIENT)
Dept: DERMATOLOGY | Facility: CLINIC | Age: 53
End: 2023-08-10
Payer: COMMERCIAL

## 2023-08-10 DIAGNOSIS — L82.0 INFLAMED SEBORRHEIC KERATOSIS: Primary | ICD-10-CM

## 2023-08-10 PROCEDURE — 17110 DESTRUCTION B9 LES UP TO 14: CPT | Performed by: NURSE PRACTITIONER

## 2023-08-10 NOTE — PROGRESS NOTES
[facility] Dermatology Note  Encounter Date: Aug 10, 2023  Office Visit     Reviewed patients past medical history and pertinent chart review prior to patients visit today.     Dermatology Problem List:  ISK face and right upper arm    Patient denies family history of skin cancer or dysplastic nevi.   Patient denies personal history of skin cancer or dysplastic nevi.   ____________________________________________    Assessment & Plan:     # Irritated and inflamed Seborrheic Keratosis.   -Discussed treatment options with patient including no treatment, cryotherapy, and shave removal. Patient prefers cryotherapy today due to irritation and itching. After verbal consent and discussion of risks and benefits including but no limited to dyspigmentation/scar, blister, and pain, 1 on the left cheek were treated with 1-2mm freeze border for 2 cycles with liquid nitrogen. Post cryotherapy instructions were provided.     Follow-up in 1 month if desires further treatment    Alcira Pugh CNP  Dermatology   _______________________________________    CC: Derm Problem (SK under right eye- cryo)    HPI:  Mr. Roverto Dave is a(n) 53 year old male who presents today as a follow-up for seborrheic keratosis.  The spot I have treated twice on the right medial cheek is gotten significantly smaller and improved in appearance but he would like it retreated and attempts at resolution.    Patient is otherwise feeling well, without additional skin concerns.    Physical Exam:  SKIN: Focused examination of face was performed.  - Right medial cheek brown cerebriform waxy plaque, significantly smaller than previous lesion    Medications:  Current Outpatient Medications   Medication    atorvastatin (LIPITOR) 20 MG tablet     No current facility-administered medications for this visit.      Past Medical History:   There is no problem list on file for this patient.    History reviewed. No pertinent past medical history.    CC No  referring provider defined for this encounter. on close of this encounter.

## 2023-08-10 NOTE — LETTER
8/10/2023         RE: Roverto Dave  [address]  [place] [address]        Dear Colleague,    Thank you for referring your patient, Roverto Dave, to the [facility]. Please see a copy of my visit note below.    [facility] Dermatology Note  Encounter Date: Aug 10, 2023  Office Visit     Reviewed patients past medical history and pertinent chart review prior to patients visit today.     Dermatology Problem List:  ISK face and right upper arm    Patient denies family history of skin cancer or dysplastic nevi.   Patient denies personal history of skin cancer or dysplastic nevi.   ____________________________________________    Assessment & Plan:     # Irritated and inflamed Seborrheic Keratosis.   -Discussed treatment options with patient including no treatment, cryotherapy, and shave removal. Patient prefers cryotherapy today due to irritation and itching. After verbal consent and discussion of risks and benefits including but no limited to dyspigmentation/scar, blister, and pain, 1 on the left cheek were treated with 1-2mm freeze border for 2 cycles with liquid nitrogen. Post cryotherapy instructions were provided.     Follow-up in 1 month if desires further treatment    Alcira Pugh CNP  Dermatology   _______________________________________    CC: Derm Problem (SK under right eye- cryo)    HPI:  . Roverto Dave is a(n) 53 year old male who presents today as a follow-up for seborrheic keratosis.  The spot I have treated twice on the right medial cheek is gotten significantly smaller and improved in appearance but he would like it retreated and attempts at resolution.    Patient is otherwise feeling well, without additional skin concerns.    Physical Exam:  SKIN: Focused examination of face was performed.  - Right medial cheek brown cerebriform waxy plaque, significantly smaller than previous lesion    Medications:  Current Outpatient Medications   Medication      atorvastatin (LIPITOR) 20 MG tablet     No current facility-administered medications for this visit.      Past Medical History:   There is no problem list on file for this patient.    History reviewed. No pertinent past medical history.    CC No referring provider defined for this encounter. on close of this encounter.       Again, thank you for allowing me to participate in the care of your patient.        Sincerely,        ALESHA Llamas CNP

## 2023-08-24 NOTE — TELEPHONE ENCOUNTER
"Colonoscopy scheduled on 9/11/23 at  with CS    Patient with high morphine allergy as anaphylaxis. Sent to [facility] anesthesia to review to see if MAC is warranted or ok to proceed as scheduled with CS.     ----------------------------------------  Addendum 08/25/23 11:12 AM     Alli Solano MD Roman, Jennifer, RN    \"When was the morphine reaction? Generally I would not anticipate a reaction to fentanyl just because of that reaction to morphine, they structurally are not very similar. I would be ok proceeding with CS if no other indications and the GI provider is ok with it.\"    Staff message sent to [facility] provider Dr. Mckeon to clarify if ok to proceed with CS.       ------------------------------------------  Addendum 08/28/23 8:19 AM   Staff message received from [facility] provider Dr. Mckeon:    From: Maricel Mckeon MD  Sent: 8/25/2023   1:05 PM CDT  To: Alli Solano MD; Laura Prado RN  Subject: RE: sedation review                              \"I would prefer MAC, thanks\"      Case will need to be rescheduled with MAC. Sent to endoscopy scheduling pool.       Laura Prado RN  Endoscopy Procedure Pre Assessment RN  [phone] option 4    "

## 2023-08-28 ENCOUNTER — TELEPHONE (OUTPATIENT)
Dept: GASTROENTEROLOGY | Facility: CLINIC | Age: 53
End: 2023-08-28
Payer: COMMERCIAL

## 2023-08-28 NOTE — TELEPHONE ENCOUNTER
----- Message from Laura Prado RN sent at 8/28/2023  8:07 AM CDT -----  Regarding: RE: reschedule with MAC  Endoscopy scheduling team - Please see note below.     MAC sedation would be preferred for colonoscopy due to morphine allergy listed. This case will need to be rescheduled with MAC.        Thank you,   Laura Prado RN  Endoscopy Procedure Pre Assessment RN  [phone] option 4       ----- Message -----  From: Maricel Mckeon MD  Sent: 8/25/2023   1:05 PM CDT  To: Alli Solano MD; Laura Prado RN  Subject: RE: sedation review                              I would prefer MAC, thanks  ----- Message -----  From: Laura Prado RN  Sent: 8/25/2023  11:04 AM CDT  To: Alli Solano MD; Maricel Mckeon MD  Subject: RE: sedation review                              Dr. Solano - Thank you for the review and recommendations.    Dr. Mckeon - Please note below. Ok to proceed with CS for colonoscopy scheduled on 9/11/23?        Thank you,   Laura Prado RN  Endoscopy Procedure Pre Assessment RN  [phone] option 4       ----- Message -----  From: Alli Solano MD  Sent: 8/24/2023   2:42 PM CDT  To: Laura Prado RN  Subject: RE: sedation review                              When was the morphine reaction? Generally I would not anticipate a reaction to fentanyl just because of that reaction to morphine, they structurally are not very similar. I would be ok proceeding with CS if no other indications and the GI provider is ok with it.     Nestor   ----- Message -----  From: Laura Prado RN  Sent: 8/24/2023   9:33 AM CDT  To: Mg Chairez Review  Subject: sedation review                                  Patient is scheduled for a Colonoscopy   Date of procedure: 9/11/23  Indication: screening   Sedation type: Conscious sedation     Reason for review: Patient with morphine allergy as anaphylaxis. Writer unable to see if patient has tolerated fentanyl well in past.     Ok to proceed with case under CS or  would MAC be recommended due to high morphine allergy severity?       Thank you,   Laura Prado RN  Endoscopy Procedure Pre Assessment RN  [phone] option 4

## 2023-08-28 NOTE — TELEPHONE ENCOUNTER
CASE IN DEPOT        Caller: [email or web address]  Reason for Reschedule/Cancellation (please be detailed, any staff messages or encounters to note?): per message below pt is needing MAC due to allergy       Prior to reschedule please review:  Ordering Provider:     Onesimo Jaeger PA-C in [facility]     Sedation per order: moderate  Does patient have any ASC Exclusions, please identify?: n      Notes on Cancelled Procedure:  Procedure: Lower Endoscopy [Colonoscopy]   Date: 09/11/2023  Location: [facility]; [address] [place] [address] [address], [place] [address]  Surgeon: Mike      Rescheduled: No

## 2023-08-29 ENCOUNTER — HOSPITAL ENCOUNTER (OUTPATIENT)
Facility: CLINIC | Age: 53
End: 2023-08-29
Attending: SPECIALIST | Admitting: SPECIALIST
Payer: COMMERCIAL

## 2023-08-29 NOTE — TELEPHONE ENCOUNTER
Rescheduled: Yes  Procedure: Lower Endoscopy [Colonoscopy]   Date: 11/1  Location: [facility]; [facility] [address] [place] [address]  Surgeon: Magnus  Sedation Level Scheduled  mac,  Reason for Sedation Level allergy to CS  Prep/Instructions updated and sent: y       Send In - basket message to Panc - José Luis Pool if EUS  procedure is canceled or rescheduled: [ N/A, YES or NO] n/a

## 2023-09-14 ENCOUNTER — OFFICE VISIT (OUTPATIENT)
Dept: DERMATOLOGY | Facility: CLINIC | Age: 53
End: 2023-09-14
Payer: COMMERCIAL

## 2023-09-14 DIAGNOSIS — L82.0 INFLAMED SEBORRHEIC KERATOSIS: Primary | ICD-10-CM

## 2023-09-14 PROCEDURE — 17110 DESTRUCTION B9 LES UP TO 14: CPT | Performed by: NURSE PRACTITIONER

## 2023-09-14 NOTE — PROGRESS NOTES
[facility] Dermatology Note  Encounter Date: Sep 14, 2023  Office Visit     Reviewed patients past medical history and pertinent chart review prior to patients visit today.     Dermatology Problem List:  ISK face and right upper arm    Patient denies family history of skin cancer or dysplastic nevi.   Patient denies personal history of skin cancer or dysplastic nevi.   ____________________________________________    Assessment & Plan:     # Irritated and inflamed Seborrheic Keratosis.   -Discussed treatment options with patient including no treatment, cryotherapy, and shave removal. Patient prefers cryotherapy today due to irritation and itching. After verbal consent and discussion of risks and benefits including but no limited to dyspigmentation/scar, blister, and pain, 1 on the left cheek were treated with 1-2mm freeze border for 2 cycles with liquid nitrogen. Post cryotherapy instructions were provided.     Follow-up in 1 month if desires further treatment    Alcira Pugh CNP  Dermatology   _______________________________________    CC: No Show and Derm Problem (Cryo to Sk under right eye)    HPI:  Mr. Roverto Dave is a(n) 53 year old male who presents today as a follow-up for seborrheic keratosis.  The spot I have treated previously on the right medial cheek is gotten significantly smaller and flattered and improved in appearance but he would like it retreated in attempts at resolution.    Patient is otherwise feeling well, without additional skin concerns.    Physical Exam:  SKIN: Focused examination of face was performed.  - Right medial cheek brown cerebriform very slightly raised plaque, significantly thinner than previous lesion    Medications:  Current Outpatient Medications   Medication    atorvastatin (LIPITOR) 20 MG tablet     No current facility-administered medications for this visit.      Past Medical History:   There is no problem list on file for this patient.    No past medical  history on file.    CC No referring provider defined for this encounter. on close of this encounter.

## 2023-09-14 NOTE — LETTER
9/14/2023         RE: Roverto Dave  [address] [place] [address]        Dear Colleague,    Thank you for referring your patient, Roverto Dave, to the [facility]. Please see a copy of my visit note below.    [facility] Dermatology Note  Encounter Date: Sep 14, 2023  Office Visit     Reviewed patients past medical history and pertinent chart review prior to patients visit today.     Dermatology Problem List:  ISK face and right upper arm    Patient denies family history of skin cancer or dysplastic nevi.   Patient denies personal history of skin cancer or dysplastic nevi.   ____________________________________________    Assessment & Plan:     # Irritated and inflamed Seborrheic Keratosis.   -Discussed treatment options with patient including no treatment, cryotherapy, and shave removal. Patient prefers cryotherapy today due to irritation and itching. After verbal consent and discussion of risks and benefits including but no limited to dyspigmentation/scar, blister, and pain, 1 on the left cheek were treated with 1-2mm freeze border for 2 cycles with liquid nitrogen. Post cryotherapy instructions were provided.     Follow-up in 1 month if desires further treatment    Alcira Pugh CNP  Dermatology   _______________________________________    CC: No Show and Derm Problem (Cryo to Sk under right eye)    HPI:  . Roverto Dave is a(n) 53 year old male who presents today as a follow-up for seborrheic keratosis.  The spot I have treated previously on the right medial cheek is gotten significantly smaller and flattered and improved in appearance but he would like it retreated in attempts at resolution.    Patient is otherwise feeling well, without additional skin concerns.    Physical Exam:  SKIN: Focused examination of face was performed.  - Right medial cheek brown cerebriform very slightly raised plaque, significantly thinner than previous lesion    Medications:  Current  Outpatient Medications   Medication     atorvastatin (LIPITOR) 20 MG tablet     No current facility-administered medications for this visit.      Past Medical History:   There is no problem list on file for this patient.    No past medical history on file.    CC No referring provider defined for this encounter. on close of this encounter.       Again, thank you for allowing me to participate in the care of your patient.        Sincerely,        ALESHA Llamas CNP

## 2023-10-31 ENCOUNTER — ANESTHESIA EVENT (OUTPATIENT)
Dept: GASTROENTEROLOGY | Facility: CLINIC | Age: 53
End: 2023-10-31
Payer: COMMERCIAL

## 2023-10-31 RX ORDER — LIDOCAINE 40 MG/G
CREAM TOPICAL
Status: CANCELLED | OUTPATIENT
Start: 2023-10-31

## 2023-10-31 RX ORDER — ONDANSETRON 2 MG/ML
4 INJECTION INTRAMUSCULAR; INTRAVENOUS
Status: CANCELLED | OUTPATIENT
Start: 2023-10-31

## 2023-10-31 ASSESSMENT — LIFESTYLE VARIABLES: TOBACCO_USE: 1

## 2023-10-31 NOTE — ANESTHESIA PREPROCEDURE EVALUATION
"Anesthesia Pre-Procedure Evaluation    Patient: Roverto Dave   MRN: [id number] : 1970        Procedure : Procedure(s):  Colonoscopy          No past medical history on file.   No past surgical history on file.   Allergies   Allergen Reactions    Morphine Anaphylaxis      Social History     Tobacco Use    Smoking status: Every Day     Types: Cigarettes    Smokeless tobacco: Never   Substance Use Topics    Alcohol use: Not on file      Wt Readings from Last 1 Encounters:   23 93.4 kg (206 lb)        Anesthesia Evaluation            ROS/MED HX  ENT/Pulmonary:     (+)                tobacco use, Current use,                      Neurologic:  - neg neurologic ROS     Cardiovascular:  - neg cardiovascular ROS     METS/Exercise Tolerance:     Hematologic:       Musculoskeletal:       GI/Hepatic:       Renal/Genitourinary:  - neg Renal ROS     Endo:  - neg endo ROS     Psychiatric/Substance Use:       Infectious Disease:       Malignancy:       Other:               OUTSIDE LABS:  CBC:   Lab Results   Component Value Date    WBC 14.6 (H) 2023    HGB 15.2 2023    HCT 45.6 2023     2023     BMP:   Lab Results   Component Value Date     2023    POTASSIUM 3.9 2023    CHLORIDE 109 2023    CO2 26 2023    BUN 20 2023    CR 1.06 2023    GLC 96 2023     COAGS: No results found for: \"PTT\", \"INR\", \"FIBR\"  POC: No results found for: \"BGM\", \"HCG\", \"HCGS\"  HEPATIC:   Lab Results   Component Value Date    ALBUMIN 3.8 2023    PROTTOTAL 7.7 2023    ALT 37 2023    AST 25 2023    ALKPHOS 58 2023    BILITOTAL 0.3 2023     OTHER:   Lab Results   Component Value Date    A1C 5.7 (H) 2023    MARQUES 8.7 2023    TSH 1.45 2023       Anesthesia Plan    ASA Status:  2       Anesthesia Type: MAC.     - Reason for MAC: straight local not clinically adequate              Consents            Postoperative Care     "   PONV prophylaxis: Ondansetron (or other 5HT-3)     Comments:                Maximino Ruiz, DO, DO

## 2023-11-01 ENCOUNTER — ANESTHESIA (OUTPATIENT)
Dept: GASTROENTEROLOGY | Facility: CLINIC | Age: 53
End: 2023-11-01
Payer: COMMERCIAL